# Patient Record
Sex: MALE | Race: WHITE | URBAN - NONMETROPOLITAN AREA
[De-identification: names, ages, dates, MRNs, and addresses within clinical notes are randomized per-mention and may not be internally consistent; named-entity substitution may affect disease eponyms.]

---

## 2022-01-14 ENCOUNTER — OFFICE VISIT (OUTPATIENT)
Dept: URBAN - NONMETROPOLITAN AREA CLINIC 6 | Facility: CLINIC | Age: 72
End: 2022-01-14
Payer: MEDICARE

## 2022-01-14 DIAGNOSIS — H25.813 COMBINED FORMS OF AGE-RELATED CATARACT, BILATERAL: Primary | ICD-10-CM

## 2022-01-14 PROCEDURE — 99203 OFFICE O/P NEW LOW 30 MIN: CPT | Performed by: OPTOMETRIST

## 2022-01-14 ASSESSMENT — INTRAOCULAR PRESSURE
OS: 16
OD: 16

## 2022-01-14 ASSESSMENT — VISUAL ACUITY
OS: 20/20
OD: 20/40

## 2022-01-14 ASSESSMENT — KERATOMETRY
OS: 42.72
OD: 42.27

## 2022-01-14 NOTE — IMPRESSION/PLAN
Impression: Combined forms of age-related cataract, bilateral: H25.813. Plan: Cataracts account for patient's complaints. Patient understands changing glasses will not improve vision. Discussed all risks, benefits, procedures and recovery. Patient desires to have surgery, recommend CE w/IOL. Recommend surgery OU, OD first then OS. Discuss AMP/ORA. Vision can only be as good as the retina will allow. Dexcycu is ok for surgery.

## 2022-01-14 NOTE — IMPRESSION/PLAN
Impression: Amyloid pterygium of left eye: H11.012. Plan: Patient education regarding findings. Recommend good ultraviolet protection and artificial tears for lubrication and comfort.

## 2022-01-20 ENCOUNTER — PRE-OPERATIVE VISIT (OUTPATIENT)
Dept: URBAN - NONMETROPOLITAN AREA CLINIC 6 | Facility: CLINIC | Age: 72
End: 2022-01-20
Payer: MEDICARE

## 2022-01-20 DIAGNOSIS — H11.012 AMYLOID PTERYGIUM OF LEFT EYE: ICD-10-CM

## 2022-01-20 PROCEDURE — 99204 OFFICE O/P NEW MOD 45 MIN: CPT | Performed by: OPHTHALMOLOGY

## 2022-01-20 ASSESSMENT — VISUAL ACUITY
OS: 20/30
OD: 20/40

## 2022-01-20 ASSESSMENT — KERATOMETRY
OS: 42.55
OD: 42.27

## 2022-01-20 ASSESSMENT — INTRAOCULAR PRESSURE
OD: 12
OS: 12

## 2022-01-20 NOTE — IMPRESSION/PLAN
Impression: Amyloid pterygium of left eye: H11.012. Plan: Patient education. Do not recommend surgery at this time. Continue to monitor.

## 2022-02-01 ENCOUNTER — TESTING ONLY (OUTPATIENT)
Dept: URBAN - NONMETROPOLITAN AREA CLINIC 6 | Facility: CLINIC | Age: 72
End: 2022-02-01
Payer: MEDICARE

## 2022-02-09 ENCOUNTER — SURGERY (OUTPATIENT)
Dept: URBAN - NONMETROPOLITAN AREA SURGERY 1 | Facility: SURGERY | Age: 72
End: 2022-02-09
Payer: MEDICARE

## 2022-02-09 DIAGNOSIS — H25.13 AGE-RELATED NUCLEAR CATARACT, BILATERAL: Primary | ICD-10-CM

## 2022-02-09 PROCEDURE — PR1CP PR1CP: CUSTOM | Performed by: OPHTHALMOLOGY

## 2022-02-09 PROCEDURE — 66984 XCAPSL CTRC RMVL W/O ECP: CPT | Performed by: OPHTHALMOLOGY

## 2022-02-10 ENCOUNTER — POST-OPERATIVE VISIT (OUTPATIENT)
Dept: URBAN - NONMETROPOLITAN AREA CLINIC 6 | Facility: CLINIC | Age: 72
End: 2022-02-10
Payer: MEDICARE

## 2022-02-10 DIAGNOSIS — Z48.810 ENCOUNTER FOR SURGICAL AFTERCARE FOLLOWING SURGERY ON A SENSE ORGAN: Primary | ICD-10-CM

## 2022-02-10 PROCEDURE — 99024 POSTOP FOLLOW-UP VISIT: CPT | Performed by: OPTOMETRIST

## 2022-02-10 ASSESSMENT — INTRAOCULAR PRESSURE
OS: 18
OD: 28
OD: 25

## 2022-02-10 NOTE — IMPRESSION/PLAN
Impression: S/P Cataract Extraction by phacoemulsification with IOL placement; ORA OD - 1 Day. Encounter for surgical aftercare following surgery on a sense organ  Z48.810. Post operative instructions reviewed - Plan: --Advised patient to use artificial tears for comfort. 2 gtts Brimonidine instilled in OD.

## 2022-02-17 ENCOUNTER — POST-OPERATIVE VISIT (OUTPATIENT)
Dept: URBAN - NONMETROPOLITAN AREA CLINIC 6 | Facility: CLINIC | Age: 72
End: 2022-02-17
Payer: MEDICARE

## 2022-02-17 PROCEDURE — 99024 POSTOP FOLLOW-UP VISIT: CPT | Performed by: OPTOMETRIST

## 2022-02-17 ASSESSMENT — INTRAOCULAR PRESSURE
OD: 19
OS: 17

## 2022-02-17 ASSESSMENT — VISUAL ACUITY: OD: 20/30

## 2022-02-17 NOTE — IMPRESSION/PLAN
Impression: S/P Cataract Extraction by phacoemulsification with IOL placement; ORA OD - 8 Days. Encounter for surgical aftercare following surgery on a sense organ  Z48.810. Excellent post op course   Condition is improving - Cataract OS with symptoms. Plan: Do not rub operated eye. Resume full activity. Discontinue wearing eye shield. OK to proceed with 2nd eye cataract surgery OS. Discussed Dexycu with patient in detail, monitor. --Advised patient to use artificial tears for comfort.

## 2022-03-02 ENCOUNTER — SURGERY (OUTPATIENT)
Dept: URBAN - NONMETROPOLITAN AREA SURGERY 1 | Facility: SURGERY | Age: 72
End: 2022-03-02
Payer: MEDICARE

## 2022-03-02 DIAGNOSIS — H25.12 AGE-RELATED NUCLEAR CATARACT, LEFT EYE: Primary | ICD-10-CM

## 2022-03-02 PROCEDURE — 66984 XCAPSL CTRC RMVL W/O ECP: CPT | Performed by: OPHTHALMOLOGY

## 2022-03-02 PROCEDURE — PR1CC PR1CC: CUSTOM | Performed by: OPHTHALMOLOGY

## 2022-03-03 ENCOUNTER — POST-OPERATIVE VISIT (OUTPATIENT)
Dept: URBAN - NONMETROPOLITAN AREA CLINIC 6 | Facility: CLINIC | Age: 72
End: 2022-03-03
Payer: MEDICARE

## 2022-03-03 DIAGNOSIS — Z96.1 PRESENCE OF INTRAOCULAR LENS: Primary | ICD-10-CM

## 2022-03-03 PROCEDURE — 99024 POSTOP FOLLOW-UP VISIT: CPT | Performed by: OPTOMETRIST

## 2022-03-03 ASSESSMENT — INTRAOCULAR PRESSURE
OD: 18
OS: 21

## 2022-03-17 ENCOUNTER — POST-OPERATIVE VISIT (OUTPATIENT)
Dept: URBAN - NONMETROPOLITAN AREA CLINIC 6 | Facility: CLINIC | Age: 72
End: 2022-03-17
Payer: MEDICARE

## 2022-03-17 PROCEDURE — 99024 POSTOP FOLLOW-UP VISIT: CPT | Performed by: OPTOMETRIST

## 2022-03-17 ASSESSMENT — INTRAOCULAR PRESSURE
OS: 17
OD: 17

## 2022-03-17 ASSESSMENT — VISUAL ACUITY
OD: 20/20
OS: 20/20

## 2022-03-17 NOTE — IMPRESSION/PLAN
Impression: S/P Cataract Extraction by phacoemulsification with IOL placement; ORA OS - 15 Days. Presence of intraocular lens  Z96.1. Excellent post op course   Condition is improving - Plan: Well position IOL's. --Advised patient to use artificial tears for comfort.

## 2022-09-19 ENCOUNTER — OFFICE VISIT (OUTPATIENT)
Dept: URBAN - NONMETROPOLITAN AREA CLINIC 6 | Facility: CLINIC | Age: 72
End: 2022-09-19
Payer: MEDICARE

## 2022-09-19 DIAGNOSIS — H11.012 AMYLOID PTERYGIUM OF LEFT EYE: ICD-10-CM

## 2022-09-19 DIAGNOSIS — H26.493 OTHER SECONDARY CATARACT, BILATERAL: Primary | ICD-10-CM

## 2022-09-19 PROCEDURE — 99213 OFFICE O/P EST LOW 20 MIN: CPT | Performed by: OPTOMETRIST

## 2022-09-19 ASSESSMENT — VISUAL ACUITY
OD: 20/25
OS: 20/25

## 2022-09-19 ASSESSMENT — INTRAOCULAR PRESSURE
OS: 13
OD: 12

## 2023-09-19 ENCOUNTER — OFFICE VISIT (OUTPATIENT)
Dept: URBAN - NONMETROPOLITAN AREA CLINIC 6 | Facility: CLINIC | Age: 73
End: 2023-09-19
Payer: MEDICARE

## 2023-09-19 DIAGNOSIS — H52.4 PRESBYOPIA: ICD-10-CM

## 2023-09-19 DIAGNOSIS — H26.493 OTHER SECONDARY CATARACT, BILATERAL: Primary | ICD-10-CM

## 2023-09-19 DIAGNOSIS — H11.012 AMYLOID PTERYGIUM OF LEFT EYE: ICD-10-CM

## 2023-09-19 PROCEDURE — 92014 COMPRE OPH EXAM EST PT 1/>: CPT | Performed by: OPTOMETRIST

## 2023-09-19 ASSESSMENT — VISUAL ACUITY
OS: 20/20
OD: 20/20

## 2023-09-19 ASSESSMENT — INTRAOCULAR PRESSURE: OD: 14

## 2024-09-24 ENCOUNTER — OFFICE VISIT (OUTPATIENT)
Dept: URBAN - NONMETROPOLITAN AREA CLINIC 6 | Facility: CLINIC | Age: 74
End: 2024-09-24
Payer: MEDICARE

## 2024-09-24 DIAGNOSIS — H26.493 OTHER SECONDARY CATARACT, BILATERAL: ICD-10-CM

## 2024-09-24 DIAGNOSIS — H11.012 AMYLOID PTERYGIUM OF LEFT EYE: ICD-10-CM

## 2024-09-24 DIAGNOSIS — E11.9 DIABETES MELLITUS TYPE 2 WITHOUT MENTION OF COMPLICATION: Primary | ICD-10-CM

## 2024-09-24 PROCEDURE — 92014 COMPRE OPH EXAM EST PT 1/>: CPT | Performed by: OPTOMETRIST

## 2024-09-24 ASSESSMENT — INTRAOCULAR PRESSURE
OS: 17
OD: 15

## 2024-09-24 ASSESSMENT — VISUAL ACUITY
OD: 20/30
OS: 20/20